# Patient Record
Sex: FEMALE | Race: ASIAN | NOT HISPANIC OR LATINO | Employment: UNEMPLOYED | URBAN - METROPOLITAN AREA
[De-identification: names, ages, dates, MRNs, and addresses within clinical notes are randomized per-mention and may not be internally consistent; named-entity substitution may affect disease eponyms.]

---

## 2017-07-31 ENCOUNTER — TELEPHONE (OUTPATIENT)
Dept: NEUROSURGERY | Facility: CLINIC | Age: 62
End: 2017-07-31

## 2017-07-31 NOTE — TELEPHONE ENCOUNTER
Mr aleah Wanted to get his  mom in to see Dr Louise for haullucinations , bc a friend referenced that Dr Louise could help. I informed him that Dr. Louise is a neurosurgeon, she would see him for surgery. I advised them to see the pcp to work his mother up, go to a new pcp who will, or get a referral to neurology to see if her haulucinations and forgetfulness are from neurologic etiology . He verbalized understanding.

## 2017-07-31 NOTE — TELEPHONE ENCOUNTER
----- Message from Sharifa Shah sent at 7/31/2017  2:01 PM CDT -----  Contact: Quang Monterroso (Son)  Quang would like to speak with Dr. Louise personally as soon as possible regarding his mother.  Quang would also like to get his mom scheduled to be seen by Dr. Louise as soon as possible.    Please contact Quang back at [phone]    Thank you

## 2017-08-01 ENCOUNTER — HOSPITAL ENCOUNTER (EMERGENCY)
Facility: HOSPITAL | Age: 62
Discharge: PSYCHIATRIC OR SUBSTANCE USE FACILITY | End: 2017-08-01
Attending: EMERGENCY MEDICINE | Admitting: EMERGENCY MEDICINE
Payer: COMMERCIAL

## 2017-08-01 VITALS
DIASTOLIC BLOOD PRESSURE: 73 MMHG | WEIGHT: 98 LBS | OXYGEN SATURATION: 98 % | SYSTOLIC BLOOD PRESSURE: 135 MMHG | HEIGHT: 60 IN | BODY MASS INDEX: 19.24 KG/M2 | HEART RATE: 75 BPM | RESPIRATION RATE: 16 BRPM | TEMPERATURE: 97 F

## 2017-08-01 DIAGNOSIS — N39.0 URINARY TRACT INFECTION WITHOUT HEMATURIA, SITE UNSPECIFIED: ICD-10-CM

## 2017-08-01 DIAGNOSIS — F32.A DEPRESSION, UNSPECIFIED DEPRESSION TYPE: Primary | ICD-10-CM

## 2017-08-01 LAB
ALBUMIN SERPL BCP-MCNC: 3.9 G/DL
ALP SERPL-CCNC: 48 U/L
ALT SERPL W/O P-5'-P-CCNC: 17 U/L
AMPHET+METHAMPHET UR QL: NEGATIVE
ANION GAP SERPL CALC-SCNC: 16 MMOL/L
APAP SERPL-MCNC: <3 UG/ML
AST SERPL-CCNC: 19 U/L
BACTERIA #/AREA URNS AUTO: ABNORMAL /HPF
BARBITURATES UR QL SCN>200 NG/ML: NEGATIVE
BASOPHILS # BLD AUTO: 0.01 K/UL
BASOPHILS NFR BLD: 0.2 %
BENZODIAZ UR QL SCN>200 NG/ML: NEGATIVE
BILIRUB SERPL-MCNC: 1 MG/DL
BILIRUB UR QL STRIP: NEGATIVE
BUN SERPL-MCNC: 28 MG/DL
BZE UR QL SCN: NEGATIVE
CALCIUM SERPL-MCNC: 9 MG/DL
CANNABINOIDS UR QL SCN: NEGATIVE
CHLORIDE SERPL-SCNC: 102 MMOL/L
CLARITY UR REFRACT.AUTO: ABNORMAL
CO2 SERPL-SCNC: 20 MMOL/L
COLOR UR AUTO: YELLOW
CREAT SERPL-MCNC: 0.7 MG/DL
CREAT UR-MCNC: 150 MG/DL
DIFFERENTIAL METHOD: ABNORMAL
EOSINOPHIL # BLD AUTO: 0.1 K/UL
EOSINOPHIL NFR BLD: 1 %
ERYTHROCYTE [DISTWIDTH] IN BLOOD BY AUTOMATED COUNT: 11.5 %
EST. GFR  (AFRICAN AMERICAN): >60 ML/MIN/1.73 M^2
EST. GFR  (NON AFRICAN AMERICAN): >60 ML/MIN/1.73 M^2
ETHANOL SERPL-MCNC: <10 MG/DL
GLUCOSE SERPL-MCNC: 80 MG/DL
GLUCOSE UR QL STRIP: NEGATIVE
HCT VFR BLD AUTO: 39.4 %
HGB BLD-MCNC: 14.1 G/DL
HGB UR QL STRIP: ABNORMAL
HYALINE CASTS UR QL AUTO: 0 /LPF
KETONES UR QL STRIP: ABNORMAL
LEUKOCYTE ESTERASE UR QL STRIP: ABNORMAL
LYMPHOCYTES # BLD AUTO: 1.7 K/UL
LYMPHOCYTES NFR BLD: 32.7 %
MCH RBC QN AUTO: 31.7 PG
MCHC RBC AUTO-ENTMCNC: 35.8 G/DL
MCV RBC AUTO: 89 FL
METHADONE UR QL SCN>300 NG/ML: NEGATIVE
MICROSCOPIC COMMENT: ABNORMAL
MONOCYTES # BLD AUTO: 0.3 K/UL
MONOCYTES NFR BLD: 6.7 %
NEUTROPHILS # BLD AUTO: 3 K/UL
NEUTROPHILS NFR BLD: 59.4 %
NITRITE UR QL STRIP: NEGATIVE
NON-SQ EPI CELLS #/AREA URNS AUTO: 5 /HPF
OPIATES UR QL SCN: NEGATIVE
PCP UR QL SCN>25 NG/ML: NEGATIVE
PH UR STRIP: 6 [PH] (ref 5–8)
PLATELET # BLD AUTO: 222 K/UL
PMV BLD AUTO: 10.2 FL
POTASSIUM SERPL-SCNC: 3.6 MMOL/L
PROT SERPL-MCNC: 7.1 G/DL
PROT UR QL STRIP: ABNORMAL
RBC # BLD AUTO: 4.45 M/UL
RBC #/AREA URNS AUTO: 9 /HPF (ref 0–4)
SALICYLATES SERPL-MCNC: <5 MG/DL
SODIUM SERPL-SCNC: 138 MMOL/L
SP GR UR STRIP: 1.03 (ref 1–1.03)
SQUAMOUS #/AREA URNS AUTO: 4 /HPF
TOXICOLOGY INFORMATION: NORMAL
TROPONIN I SERPL DL<=0.01 NG/ML-MCNC: 0.01 NG/ML
TSH SERPL DL<=0.005 MIU/L-ACNC: 1.06 UIU/ML
URN SPEC COLLECT METH UR: ABNORMAL
UROBILINOGEN UR STRIP-ACNC: ABNORMAL EU/DL
WBC # BLD AUTO: 5.08 K/UL
WBC #/AREA URNS AUTO: >100 /HPF (ref 0–5)

## 2017-08-01 PROCEDURE — 99284 EMERGENCY DEPT VISIT MOD MDM: CPT | Mod: ,,, | Performed by: EMERGENCY MEDICINE

## 2017-08-01 PROCEDURE — 87086 URINE CULTURE/COLONY COUNT: CPT

## 2017-08-01 PROCEDURE — 80329 ANALGESICS NON-OPIOID 1 OR 2: CPT

## 2017-08-01 PROCEDURE — 85025 COMPLETE CBC W/AUTO DIFF WBC: CPT

## 2017-08-01 PROCEDURE — 93010 ELECTROCARDIOGRAM REPORT: CPT | Mod: ,,, | Performed by: INTERNAL MEDICINE

## 2017-08-01 PROCEDURE — 80053 COMPREHEN METABOLIC PANEL: CPT

## 2017-08-01 PROCEDURE — 80307 DRUG TEST PRSMV CHEM ANLYZR: CPT

## 2017-08-01 PROCEDURE — 81001 URINALYSIS AUTO W/SCOPE: CPT

## 2017-08-01 PROCEDURE — 25000003 PHARM REV CODE 250: Performed by: EMERGENCY MEDICINE

## 2017-08-01 PROCEDURE — 96361 HYDRATE IV INFUSION ADD-ON: CPT

## 2017-08-01 PROCEDURE — 84443 ASSAY THYROID STIM HORMONE: CPT

## 2017-08-01 PROCEDURE — 84484 ASSAY OF TROPONIN QUANT: CPT

## 2017-08-01 PROCEDURE — 93005 ELECTROCARDIOGRAM TRACING: CPT

## 2017-08-01 PROCEDURE — 96360 HYDRATION IV INFUSION INIT: CPT

## 2017-08-01 PROCEDURE — 99285 EMERGENCY DEPT VISIT HI MDM: CPT | Mod: 25

## 2017-08-01 PROCEDURE — 80320 DRUG SCREEN QUANTALCOHOLS: CPT

## 2017-08-01 RX ORDER — CIPROFLOXACIN 500 MG/1
500 TABLET ORAL
Status: COMPLETED | OUTPATIENT
Start: 2017-08-01 | End: 2017-08-01

## 2017-08-01 RX ADMIN — SODIUM CHLORIDE 1000 ML: 0.9 INJECTION, SOLUTION INTRAVENOUS at 12:08

## 2017-08-01 RX ADMIN — CIPROFLOXACIN HYDROCHLORIDE 500 MG: 500 TABLET, FILM COATED ORAL at 04:08

## 2017-08-01 NOTE — ED NOTES
SPD arrived, hospital police present.  Labeled unopened patient belongings given to SPD staff.  Patient escorted to vehicle.

## 2017-08-01 NOTE — ED NOTES
Bed: [facility] 01  Expected date:   Expected time:   Means of arrival:   Comments:

## 2017-08-01 NOTE — ED NOTES
Patient accepted [facility] per ELI Moreno. Accepting physician is Dr. Kyrie Jacobson. Call report to [phone].

## 2017-08-01 NOTE — ED NOTES
Admission packet faxed to [facility], [facility] ([facility]), [facility] ([place], [place]), [facility], [facility], [facility], [facility], [facility], [facility], [facility]. [facility] ([facility]), [facility], [facility], [facility], [facility], [facility] and [facility] ([facility], [facility], [place], [place], [place], [facility], [facility], [place]). Awaiting response from facilities.

## 2017-08-01 NOTE — ED PROVIDER NOTES
Encounter Date: 7/31/2017    SCRIBE #1 NOTE: I, Sanchez Fletcher, am scribing for, and in the presence of, Dr. Mcguire.       History     Chief Complaint   Patient presents with    Hallucinations     pt not eating x4 days, pt with depression but denies SI/HI. pt having hallucinations.      Time seen by provider: 12:17 AM    This is a 61 y.o. female with no known pertinent PMHx who presents to the ED on the recommendation of psychiatry for placement with a chief complaint of gradually worsening moderate to severe dysphoric mood with associated depression, delusions, hallucinations, decreased PO intake, and weight loss all over the last ~2 months. Per pt's son the pt has previously seen two psychiatrists for these symptoms. The first was seen ~3 weeks ago and at that time the pt was diagnosed with major depression with psychotic features and was prescribed medication. Pt took the medication but it made her sleepy and also over time the symptoms worsened. The psychiatrist recommended admission due to the pt losing weight and not eating/drinking but when the family took the pt to the hospital she got scared and anxious so they took her home. They then went to see a second psychiatrist 2 weeks ago who spoke [place] so she would be more comfortable, he agreed with the first psychiatrists diagnosis and prescribed her Abilify and Cymbalta and also increased the dosages. However pt is now refusing to take her medications so he recommended she be brought to the hospital for psychiatric placement which is why she is here. Pt's symptoms are described to consist of olfactory and tactile hallucinations, she thinks she smells bad and also complains of body aches and being cold. Pt also believes that the people on TV are watching her and that there are people who are trying to get her and do surgery on her against her will. Pt does not want to leave her house and also does not want her family to leave because she thinks they won't  come back. Per son the pt has never had a history of psychiatric problems in the past and thus all started after the pt's daughter was expelled from school 4-5 months ago. Initially the pt complained of chest pain but EKG's, lab work, and thoracic MRI all were negative and then the pt began to exhibit psychiatric symptoms ~2 months ago. Pt has not had a CT of her head done as of yet as her psychiatrists do not think there is an organic etiology to her symptoms. There are no other associated symptoms or mitigating/aggravating factors reported at this time.       The history is provided by a relative.     Review of patient's allergies indicates:  No Known Allergies  No past medical history on file.  Past Surgical History:   Procedure Laterality Date    CATARACT EXTRACTION, BILATERAL       Family History   Problem Relation Age of Onset    Breast cancer Neg Hx     Colon cancer Neg Hx     Ovarian cancer Neg Hx      Social History   Substance Use Topics    Smoking status: Never Smoker    Smokeless tobacco: Not on file    Alcohol use No     Review of Systems   Constitutional: Negative for chills and fever.   HENT: Negative for congestion.    Eyes: Negative for pain.   Respiratory: Negative for cough and shortness of breath.    Cardiovascular: Negative for chest pain.   Gastrointestinal: Negative for abdominal pain, nausea and vomiting.   Genitourinary: Negative for difficulty urinating and dysuria.   Musculoskeletal: Negative for back pain and neck pain.   Skin: Negative for rash.   Neurological: Negative for weakness and headaches.   Psychiatric/Behavioral: Positive for dysphoric mood and hallucinations.       Physical Exam     Initial Vitals [07/31/17 2313]   BP Pulse Resp Temp SpO2   136/78 88 18 98.3 °F (36.8 °C) 98 %      MAP       97.33         Physical Exam    Nursing note and vitals reviewed.  Constitutional: She appears well-developed and well-nourished. She is not diaphoretic. No distress.   HENT:   Head:  Normocephalic and atraumatic.   Eyes: EOM are normal. Pupils are equal, round, and reactive to light.   Neck: Normal range of motion. Neck supple.   Cardiovascular: Normal rate and regular rhythm. Exam reveals no gallop and no friction rub.    No murmur heard.  Pulmonary/Chest: Breath sounds normal. No respiratory distress. She has no wheezes. She has no rhonchi. She has no rales.   Abdominal: Soft. She exhibits no distension. There is no tenderness. There is no rebound and no guarding.   Musculoskeletal: Normal range of motion. She exhibits no edema or tenderness.   Neurological: She is alert and oriented to person, place, and time. She has normal strength. No cranial nerve deficit or sensory deficit.   Skin: Skin is warm and dry. No rash noted. No erythema.   Psychiatric:   Pt is depressed and almost catatonic.          ED Course   Procedures  Labs Reviewed   CBC W/ AUTO DIFFERENTIAL - Abnormal; Notable for the following:        Result Value    MCH 31.7 (*)     All other components within normal limits   COMPREHENSIVE METABOLIC PANEL - Abnormal; Notable for the following:     CO2 20 (*)     BUN, Bld 28 (*)     Alkaline Phosphatase 48 (*)     All other components within normal limits   ACETAMINOPHEN LEVEL - Abnormal; Notable for the following:     Acetaminophen (Tylenol), Serum <3.0 (*)     All other components within normal limits   URINALYSIS, REFLEX TO URINE CULTURE - Abnormal; Notable for the following:     Appearance, UA Cloudy (*)     Protein, UA 1+ (*)     Ketones, UA 3+ (*)     Occult Blood UA 1+ (*)     Urobilinogen, UA 4.0-6.0 (*)     Leukocytes, UA 3+ (*)     All other components within normal limits    Narrative:     Preferred Collection Type->Urine, Clean Catch   SALICYLATE LEVEL - Abnormal; Notable for the following:     Salicylate Lvl <5.0 (*)     All other components within normal limits   URINALYSIS MICROSCOPIC - Abnormal; Notable for the following:     RBC, UA 9 (*)     WBC, UA >100 (*)      Non-Squam Epith 5 (*)     All other components within normal limits    Narrative:     Preferred Collection Type->Urine, Clean Catch   CULTURE, URINE   TSH   DRUG SCREEN PANEL, URINE EMERGENCY    Narrative:     Preferred Collection Type->Urine, Clean Catch   ALCOHOL,MEDICAL (ETHANOL)   TROPONIN I     EKG Readings: (Independently Interpreted)   NSR, no ST elevation or depression.        X-Rays:   Independently Interpreted Readings:   Chest X-Ray: no acute process    Head CT: no acute process      Medical Decision Making:   History:   Old Medical Records: I decided to obtain old medical records.  Initial Assessment:   My initial differential diagnoses include but are not limited to depression, psychosis, brain tumor, and dehydration. This is a 61 y.o. female who presents with 2 months of worsening depression with some psychotic features sent in by psychiatry for placement. Will rule out organic cause including head CT and if all negative will PEC and place.   Independently Interpreted Test(s):   I have ordered and independently interpreted X-rays - see prior notes.  I have ordered and independently interpreted EKG Reading(s) - see prior notes  Clinical Tests:   Lab Tests: Ordered and Reviewed  Radiological Study: Ordered and Reviewed  Medical Tests: Ordered and Reviewed            Scribe Attestation:   Scribe #1: I performed the above scribed service and the documentation accurately describes the services I performed. I attest to the accuracy of the note.    Attending Attestation:           Physician Attestation for Scribe:  Physician Attestation Statement for Scribe #1: I, Dr. Mcguire, reviewed documentation, as scribed by Sanchez Fletcher in my presence, and it is both accurate and complete.         Attending ED Notes:   4:12 am   Pt was found to have a UTI. Will treat with ciprofloxacin but I do not feel this is the reason for her depression and psychosis. Pt is now medically cleared so will involuntarily commit and  transfer her to a psychiatric facility.           ED Course     Clinical Impression:   The primary encounter diagnosis was Depression, unspecified depression type. A diagnosis of Urinary tract infection without hematuria, site unspecified was also pertinent to this visit.    Disposition:   Disposition: Transferred                        Luis Mcguire III, MD  08/02/17 0619

## 2017-08-01 NOTE — ED NOTES
Quang Monterroso, son, notified of pending discharge to [facility]. Telephone number and address to facility given to son.

## 2017-08-01 NOTE — ED NOTES
PEC received in [facility] at 0432. Will begin seeking inpatient psychiatric placement.

## 2017-08-01 NOTE — ED NOTES
Patient transferred to [facility] at approximately 0505. Transported via wheelchair per MARIO Leiva RN and two security officers. Family took belongings home. Only has 1 pair of shoes in possession.

## 2017-08-01 NOTE — ED NOTES
Report received from [facility] nurse.  Patient quiet and sitting up on the bed.  No distress noted.

## 2017-08-02 LAB — BACTERIA UR CULT: NORMAL

## 2017-08-10 ENCOUNTER — TELEPHONE (OUTPATIENT)
Dept: NEUROSURGERY | Facility: CLINIC | Age: 62
End: 2017-08-10

## 2017-08-10 DIAGNOSIS — R41.3 MEMORY LOSS: Primary | ICD-10-CM

## 2017-09-08 ENCOUNTER — HOSPITAL ENCOUNTER (OUTPATIENT)
Dept: RADIOLOGY | Facility: HOSPITAL | Age: 62
Discharge: HOME | End: 2017-09-08
Attending: NEUROLOGICAL SURGERY
Payer: COMMERCIAL

## 2017-09-08 DIAGNOSIS — R41.3 MEMORY LOSS: ICD-10-CM

## 2017-09-08 PROCEDURE — 70553 MRI BRAIN STEM W/O & W/DYE: CPT | Mod: TC

## 2017-09-08 PROCEDURE — 25500020 PHARM REV CODE 255: Performed by: NEUROLOGICAL SURGERY

## 2017-09-08 PROCEDURE — A9585 GADOBUTROL INJECTION: HCPCS | Performed by: NEUROLOGICAL SURGERY

## 2017-09-08 PROCEDURE — 70553 MRI BRAIN STEM W/O & W/DYE: CPT | Mod: 26,,, | Performed by: RADIOLOGY

## 2017-09-08 RX ORDER — GADOBUTROL 604.72 MG/ML
6 INJECTION INTRAVENOUS
Status: COMPLETED | OUTPATIENT
Start: 2017-09-08 | End: 2017-09-08

## 2017-09-08 RX ADMIN — GADOBUTROL 6 ML: 604.72 INJECTION INTRAVENOUS at 04:09

## 2020-04-30 ENCOUNTER — TELEPHONE (OUTPATIENT)
Dept: NEUROLOGY | Facility: CLINIC | Age: 65
End: 2020-04-30

## 2020-05-06 ENCOUNTER — OFFICE VISIT (OUTPATIENT)
Dept: NEUROLOGY | Facility: CLINIC | Age: 65
End: 2020-05-06
Payer: COMMERCIAL

## 2020-05-06 DIAGNOSIS — G44.219 EPISODIC TENSION-TYPE HEADACHE, NOT INTRACTABLE: Primary | ICD-10-CM

## 2020-05-06 DIAGNOSIS — G47.9 DIFFICULTY SLEEPING: ICD-10-CM

## 2020-05-06 PROCEDURE — 99204 OFFICE O/P NEW MOD 45 MIN: CPT | Mod: 95,,, | Performed by: PSYCHIATRY & NEUROLOGY

## 2020-05-06 PROCEDURE — 99204 PR OFFICE/OUTPT VISIT, NEW, LEVL IV, 45-59 MIN: ICD-10-PCS | Mod: 95,,, | Performed by: PSYCHIATRY & NEUROLOGY

## 2020-05-06 RX ORDER — OXCARBAZEPINE 600 MG/1
TABLET, FILM COATED ORAL 2 TIMES DAILY
COMMUNITY
Start: 2020-04-03

## 2020-05-06 RX ORDER — OLANZAPINE 20 MG/1
TABLET, ORALLY DISINTEGRATING ORAL
COMMUNITY
Start: 2020-04-09

## 2020-05-06 RX ORDER — BUPROPION HYDROCHLORIDE 300 MG/1
TABLET ORAL
COMMUNITY
Start: 2020-04-11

## 2020-05-06 NOTE — PROGRESS NOTES
Neurology Consult Note    Chief Complaint: headaches    HPI:   Nirmal Inman is a 64 y.o. female with medical conditions as outlined below who presents for further evaluation of headaches. She is accompanied to the visit by her son who aided in giving history and translated as patient speaks Spanish. I notified son that the reason patient was referred was for evaluation for dementia, however, he states that patient has no problems with memory. He states she is able to perform all ADLs on her own without difficulty. He denies her talking about things that are not happening. He states she has a history of anxiety and psychosis and was admitted to a psychiatric hospital for this and was started on medication for this and her symptoms completely resolved. He states a few months ago, she had stopped taking her psychiatric medications and symptoms returned, but she is now back on them and has been functioning normally. He has no concerns for her memory and states she is able to function independently on her own. He states she cooks and denies her forgetting to turn off the stove. He denies her having recent falls or bowel or bladder problems.  He states she follows with a psychiatrist at [facility] regularly. He states that the psychiatrist told him that his mother is doing well in terms of her anxiety and psychosis. He would like patient to be further evaluated for headaches.    Patient states for the past 2-3 years, she has been having gradual onset aching pain on the top of her head. She states these have remained stable. She denies associated neck pain, photophobia, phonophobia, nasal congestion, nausea or change in vision. She states the pain gets up to a 5-6/10 and comes and goes all day. She state this can occur 4 to 5 days a week for one to two weeks a month. She states some days, she does not have any headache. She denies a family history of aneurysm. She denies a history of migraines or headaches when she  was younger. She states she only sleeps 4-5 hours a night. She feels this is due to anxiety. She has not tried taking over the counter medication for headache.     Past Medical History:  Past Medical History:   Diagnosis Date    GERD (gastroesophageal reflux disease)     HLD (hyperlipidemia)     Psychosis        Past Surgical History:  Past Surgical History:   Procedure Laterality Date    CATARACT EXTRACTION, BILATERAL         Social History:  Social History     Socioeconomic History    Marital status:      Spouse name: Not on file    Number of children: Not on file    Years of education: Not on file    Highest education level: Not on file   Occupational History    Not on file   Social Needs    Financial resource strain: Not on file    Food insecurity:     Worry: Not on file     Inability: Not on file    Transportation needs:     Medical: Not on file     Non-medical: Not on file   Tobacco Use    Smoking status: Never Smoker   Substance and Sexual Activity    Alcohol use: No    Drug use: No    Sexual activity: Not on file   Lifestyle    Physical activity:     Days per week: Not on file     Minutes per session: Not on file    Stress: Not on file   Relationships    Social connections:     Talks on phone: Not on file     Gets together: Not on file     Attends Taoist service: Not on file     Active member of club or organization: Not on file     Attends meetings of clubs or organizations: Not on file     Relationship status: Not on file   Other Topics Concern    Not on file   Social History Narrative    Not on file       Family History:  Family History   Problem Relation Age of Onset    Breast cancer Neg Hx     Colon cancer Neg Hx     Ovarian cancer Neg Hx        Medications:  atorvastatin 20mg daily, Wellbutrin 300mg daily, zyprexa 20mg at bedtime, oxcarbazepine 600mg two times daily, omeprazole 40mg daily    Allergies:  Review of patient's allergies indicates:  No Known  Allergies    ROS:  A 12 point review of system was negative aside from pertinent positives and negatives as outlined above.    Physical Exam  General: well nourished, well developed  Eyes: no scleral icterus   Neck: ROM intact  Skin: no obvious rashes     Neuro:  Limited exam as patient seen via virtual visit  Neuro:  Mental status: AAO x 3, no dysarthria, no aphasia, communicating appropriately  CN: EOMI, VFF, no gross facial asymmetry, hearing grossly intact, tongue midline  Motor:   Moves all extremities at least 4/5  Coordination: no dysmetria on FTN  Sensation: exam limited due to virtual visit  Gait: steady    Prior Imaging/Labs:  Reviewed      Assessment and Plan:    64 y.o. female with headaches likely 2/2 tension type headache, she has an MRI pending ordered by her PCP, will have staff schedule this for patient. Tension type headache likely exacerbated by lack of sleep. Son states psychiatrist has told them that her mood is under good control. I will refer patient to sleep medicine to see if they can help patient obtain better sleep. If sleep still does not improve, patients son was advised to contact patient's psychiatrist to see if medications can be adjusted to help with sleep.     1. Episodic tension-type headache  Likely exacerbated due to lack of sleep, if MRI unrevealing and headache persists despite improved sleep, will consider starting medication for this at next visit   Patient was advised to try tylenol as needed for headache, but was advised not to take this more than 3 times a week as taking this often can result in medication overuse headache    2. Difficulty sleeping  - Ambulatory referral/consult to Sleep Disorders; Future    Patient and son were advised to notify me for worsening symptoms. They were made aware that I will be moving out of state later this month. They were advised to establish care with Dr. Crowley in a month for further evaluation and they are in agreement with this plan.  Will have staff set this up for patient.    The patient location is: home  The chief complaint leading to consultation is: headache  Visit type: Virtual visit with synchronous audio and video  Total time spent with patient: 40 minutes  Each patient to whom he or she provides medical services by telemedicine is:  (1) informed of the relationship between the physician and patient and the respective role of any other health care provider with respect to management of the patient; and (2) notified that he or she may decline to receive medical services by telemedicine and may withdraw from such care at any time.    Thank you for this consultation.    Mer Martínez DO  [facility]  [address] [facility] [address]  [place] [address]  [phone]

## 2020-09-04 ENCOUNTER — PATIENT OUTREACH (OUTPATIENT)
Dept: ADMINISTRATIVE | Facility: HOSPITAL | Age: 65
End: 2020-09-04

## 2022-01-13 PROBLEM — F03.91 DEMENTIA WITH BEHAVIORAL DISTURBANCE, UNSPECIFIED DEMENTIA TYPE: Status: ACTIVE | Noted: 2022-01-13

## 2022-01-13 PROBLEM — F32.3 MAJOR DEPRESSION WITH PSYCHOTIC FEATURES: Status: ACTIVE | Noted: 2022-01-13

## 2023-04-03 PROBLEM — F03.918 DEMENTIA WITH BEHAVIORAL DISTURBANCE: Status: ACTIVE | Noted: 2022-01-13

## 2025-05-28 ENCOUNTER — OFFICE VISIT (OUTPATIENT)
Dept: SURGERY | Facility: CLINIC | Age: 70
End: 2025-05-28
Payer: MEDICARE

## 2025-05-28 VITALS
BODY MASS INDEX: 21.01 KG/M2 | HEIGHT: 60 IN | SYSTOLIC BLOOD PRESSURE: 194 MMHG | WEIGHT: 107 LBS | DIASTOLIC BLOOD PRESSURE: 82 MMHG | HEART RATE: 86 BPM

## 2025-05-28 DIAGNOSIS — R22.0 SCALP MASS: Primary | ICD-10-CM

## 2025-05-28 PROCEDURE — 3079F DIAST BP 80-89 MM HG: CPT | Mod: CPTII,S$GLB,, | Performed by: SURGERY

## 2025-05-28 PROCEDURE — 3008F BODY MASS INDEX DOCD: CPT | Mod: CPTII,S$GLB,, | Performed by: SURGERY

## 2025-05-28 PROCEDURE — 99204 OFFICE O/P NEW MOD 45 MIN: CPT | Mod: S$GLB,,, | Performed by: SURGERY

## 2025-05-28 PROCEDURE — 1101F PT FALLS ASSESS-DOCD LE1/YR: CPT | Mod: CPTII,S$GLB,, | Performed by: SURGERY

## 2025-05-28 PROCEDURE — 1125F AMNT PAIN NOTED PAIN PRSNT: CPT | Mod: CPTII,S$GLB,, | Performed by: SURGERY

## 2025-05-28 PROCEDURE — 99999 PR PBB SHADOW E&M-EST. PATIENT-LVL V: CPT | Mod: PBBFAC,,, | Performed by: SURGERY

## 2025-05-28 PROCEDURE — 3077F SYST BP >= 140 MM HG: CPT | Mod: CPTII,S$GLB,, | Performed by: SURGERY

## 2025-05-28 PROCEDURE — 1159F MED LIST DOCD IN RCRD: CPT | Mod: CPTII,S$GLB,, | Performed by: SURGERY

## 2025-05-28 PROCEDURE — 3288F FALL RISK ASSESSMENT DOCD: CPT | Mod: CPTII,S$GLB,, | Performed by: SURGERY

## 2025-05-28 RX ORDER — CEFAZOLIN SODIUM 2 G/50ML
2 SOLUTION INTRAVENOUS
OUTPATIENT
Start: 2025-05-28

## 2025-05-28 RX ORDER — MUPIROCIN 20 MG/G
OINTMENT TOPICAL
OUTPATIENT
Start: 2025-05-28

## 2025-05-28 RX ORDER — SODIUM CHLORIDE 9 MG/ML
INJECTION, SOLUTION INTRAVENOUS CONTINUOUS
OUTPATIENT
Start: 2025-05-28

## 2025-05-28 NOTE — PROGRESS NOTES
[facility] General Surgery  History & Physical    Subjective:      Chief Complaint/Reason for Admission: scalp mass    Nirmal Inman is a 69 y.o. 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    All other systems reviewed and are negative.      Objective:      Vital Signs (Most Recent)  Pulse: 86 (05/28/25 1027)  BP: (!) 194/82 (05/28/25 1027)    Vital Signs Range (Last 24H):  [unfilled]    Physical Exam  Vitals reviewed.   Constitutional:       Appearance: She is well-developed.   HENT:      Head: Normocephalic and atraumatic.        Right Ear: External ear normal.      Left Ear: External ear normal.      Nose: Nose normal.   Eyes:      Conjunctiva/sclera: Conjunctivae normal.      Pupils: Pupils are equal, round, and reactive to light.   Cardiovascular:      Rate and Rhythm: Normal rate and regular rhythm.      Heart sounds: Normal heart sounds.   Pulmonary:      Effort: Pulmonary effort is normal.      Breath sounds: Normal breath sounds.   Abdominal:      General: Bowel sounds are normal.      Palpations: Abdomen is soft.   Musculoskeletal:         General: Normal range of motion.      Cervical back: Normal range of motion and neck supple.   Skin:     General: Skin is warm and dry.   Neurological:      Mental Status: She is alert and oriented to person, place, and time.      Deep Tendon Reflexes: Reflexes are normal and symmetric.   Psychiatric:         Behavior: Behavior normal.         Thought Content: Thought content normal.         Data Review:  none   ECG: none.     Assessment:      Scalp  mass squamous cell ca     Plan:    I discussed the path and the need for excision with the risks explained and he agrees to proceed             [1]   Social History  Tobacco Use    Smoking status: Never    Smokeless tobacco: Never   Vaping Use    Vaping status: Every Day    Passive vaping exposure: Yes   Substance Use Topics    Alcohol use: No    Drug use: No